# Patient Record
Sex: FEMALE | Race: WHITE | NOT HISPANIC OR LATINO | URBAN - METROPOLITAN AREA
[De-identification: names, ages, dates, MRNs, and addresses within clinical notes are randomized per-mention and may not be internally consistent; named-entity substitution may affect disease eponyms.]

---

## 2019-06-04 ENCOUNTER — EMERGENCY (EMERGENCY)
Facility: HOSPITAL | Age: 84
LOS: 1 days | Discharge: HOME | End: 2019-06-04
Attending: EMERGENCY MEDICINE | Admitting: EMERGENCY MEDICINE
Payer: MEDICARE

## 2019-06-04 VITALS
RESPIRATION RATE: 16 BRPM | TEMPERATURE: 98 F | DIASTOLIC BLOOD PRESSURE: 81 MMHG | HEART RATE: 87 BPM | SYSTOLIC BLOOD PRESSURE: 119 MMHG | OXYGEN SATURATION: 97 %

## 2019-06-04 VITALS
WEIGHT: 160.06 LBS | HEART RATE: 125 BPM | OXYGEN SATURATION: 95 % | HEIGHT: 64 IN | SYSTOLIC BLOOD PRESSURE: 131 MMHG | TEMPERATURE: 98 F | RESPIRATION RATE: 18 BRPM | DIASTOLIC BLOOD PRESSURE: 84 MMHG

## 2019-06-04 PROCEDURE — 72125 CT NECK SPINE W/O DYE: CPT | Mod: 26

## 2019-06-04 PROCEDURE — 72170 X-RAY EXAM OF PELVIS: CPT

## 2019-06-04 PROCEDURE — 99284 EMERGENCY DEPT VISIT MOD MDM: CPT

## 2019-06-04 PROCEDURE — 70450 CT HEAD/BRAIN W/O DYE: CPT

## 2019-06-04 PROCEDURE — 70450 CT HEAD/BRAIN W/O DYE: CPT | Mod: 26

## 2019-06-04 PROCEDURE — 73502 X-RAY EXAM HIP UNI 2-3 VIEWS: CPT

## 2019-06-04 PROCEDURE — 73502 X-RAY EXAM HIP UNI 2-3 VIEWS: CPT | Mod: 26,LT

## 2019-06-04 PROCEDURE — 72125 CT NECK SPINE W/O DYE: CPT

## 2019-06-04 PROCEDURE — 99284 EMERGENCY DEPT VISIT MOD MDM: CPT | Mod: 25

## 2019-06-04 RX ORDER — ACETAMINOPHEN 500 MG
650 TABLET ORAL ONCE
Refills: 0 | Status: DISCONTINUED | OUTPATIENT
Start: 2019-06-04 | End: 2019-06-04

## 2019-06-04 RX ORDER — ACETAMINOPHEN 500 MG
650 TABLET ORAL ONCE
Refills: 0 | Status: COMPLETED | OUTPATIENT
Start: 2019-06-04 | End: 2019-06-04

## 2019-06-04 RX ADMIN — Medication 650 MILLIGRAM(S): at 04:40

## 2019-06-04 RX ADMIN — Medication 650 MILLIGRAM(S): at 04:35

## 2019-06-04 NOTE — ED ADULT TRIAGE NOTE - CHIEF COMPLAINT QUOTE
patient found on the floor by staff during their rounds, unwitnessed fall, wound noted to left upper lip

## 2019-06-04 NOTE — ED PROVIDER NOTE - OBJECTIVE STATEMENT
89yo female bib ems after being foundon the floor. according to ems pt was found on the floor during rounds, unknown amt of time, no vomiting pt is nonverbal and has dementia so hx is limited

## 2019-06-04 NOTE — ED ADULT NURSE NOTE - OBJECTIVE STATEMENT
89 y/o F patient presents to ED from [place] via EMS s/p unwitnessed fall. As per EMS staff was doing rounds when they noticed patient was on floor. Staff reports they are unaware of how long patient was on floor. Patient A&Ox0. lungs CTA. laceration noted to left side of upper lip. abdomen soft, non tender, non distended. Non verbal indicators of pain not present. Safety and comfort measures provided and maintained.

## 2019-06-06 ENCOUNTER — EMERGENCY (EMERGENCY)
Facility: HOSPITAL | Age: 84
LOS: 1 days | Discharge: HOME | End: 2019-06-06
Attending: EMERGENCY MEDICINE | Admitting: EMERGENCY MEDICINE
Payer: MEDICARE

## 2019-06-06 VITALS
SYSTOLIC BLOOD PRESSURE: 120 MMHG | TEMPERATURE: 98 F | RESPIRATION RATE: 16 BRPM | HEART RATE: 109 BPM | OXYGEN SATURATION: 100 % | DIASTOLIC BLOOD PRESSURE: 60 MMHG

## 2019-06-06 VITALS
OXYGEN SATURATION: 100 % | RESPIRATION RATE: 16 BRPM | SYSTOLIC BLOOD PRESSURE: 126 MMHG | HEART RATE: 95 BPM | DIASTOLIC BLOOD PRESSURE: 60 MMHG

## 2019-06-06 PROCEDURE — 99284 EMERGENCY DEPT VISIT MOD MDM: CPT | Mod: 25

## 2019-06-06 PROCEDURE — 72125 CT NECK SPINE W/O DYE: CPT | Mod: 26,GV

## 2019-06-06 PROCEDURE — 72125 CT NECK SPINE W/O DYE: CPT

## 2019-06-06 PROCEDURE — 99284 EMERGENCY DEPT VISIT MOD MDM: CPT

## 2019-06-06 PROCEDURE — 70450 CT HEAD/BRAIN W/O DYE: CPT

## 2019-06-06 PROCEDURE — 70450 CT HEAD/BRAIN W/O DYE: CPT | Mod: 26,GV

## 2019-06-06 NOTE — ED ADULT NURSE REASSESSMENT NOTE - NS ED NURSE REASSESS COMMENT FT1
patient calm and resting, daughter at bedside with patient, no attempts to get out of bed. D/C to facility with negative CT scan. Awaiting ambulance transport back to facility.

## 2019-06-06 NOTE — ED PROVIDER NOTE - OBJECTIVE STATEMENT
89yo 89yo female bib ems s/p fall out o fbed. pt was found on the floor, unknown down time, pt has dementia so hx is limited. pt fell 2 days ago as well

## 2019-06-06 NOTE — ED PROVIDER NOTE - CARE PLAN
Principal Discharge DX:	Head injury due to trauma  Secondary Diagnosis:	Skin tear of elbow without complication

## 2019-06-06 NOTE — ED ADULT TRIAGE NOTE - CHIEF COMPLAINT QUOTE
Patient presents via ambulance , EMS states "nurse from facility found patient on floor by bed" unknown time on floor as per EMS. patient is on Xarelto  patient has dementia. bruise on lip noted, as per Dr Goss who treated patient monday for fall, patient sustained bruise on Monday.   patient is at neurological baseline as per EMS. Patient presents via ambulance , EMS states "nurse from facility found patient on floor by bed" unknown time on floor as per EMS. patient is on Xarelto  patient has dementia. bruise on lip noted, as per Dr Goss who treated patient 6/4/19 for fall, patient sustained bruise on 6/4/19.   patient is at neurological baseline as per EMS.

## 2019-06-06 NOTE — ED ADULT NURSE NOTE - OBJECTIVE STATEMENT
patient found on floor at nursing home, arrives with skin tear to left elbow, skin reapproximated by me with xeroform gauze and kathy dressing as per MD. Old bruising noted above left lip area, bluish/purplish in color. No other visible injury. Patient confused, speaks at times but oriented only to person.

## 2019-06-06 NOTE — ED ADULT NURSE NOTE - NSIMPLEMENTINTERV_GEN_ALL_ED
Implemented All Fall with Harm Risk Interventions:  [place] to call system. Call bell, personal items and telephone within reach. Instruct patient to call for assistance. Room bathroom lighting operational. Non-slip footwear when patient is off stretcher. Physically safe environment: no spills, clutter or unnecessary equipment. Stretcher in lowest position, wheels locked, appropriate side rails in place. Provide visual cue, wrist band, yellow gown, etc. Monitor gait and stability. Monitor for mental status changes and reorient to person, place, and time. Review medications for side effects contributing to fall risk. Reinforce activity limits and safety measures with patient and family. Provide visual clues: red socks.

## 2019-06-06 NOTE — ED ADULT NURSE NOTE - CHIEF COMPLAINT QUOTE
Patient presents via ambulance , EMS states "nurse from facility found patient on floor by bed" unknown time on floor as per EMS. patient is on Xarelto  patient has dementia. bruise on lip noted, as per Dr Goss who treated patient 6/4/19 for fall, patient sustained bruise on 6/4/19.   patient is at neurological baseline as per EMS.

## 2019-06-07 PROBLEM — E78.5 HYPERLIPIDEMIA, UNSPECIFIED: Chronic | Status: ACTIVE | Noted: 2019-06-04

## 2019-06-07 PROBLEM — M19.90 UNSPECIFIED OSTEOARTHRITIS, UNSPECIFIED SITE: Chronic | Status: ACTIVE | Noted: 2019-06-04

## 2019-06-07 PROBLEM — F32.9 MAJOR DEPRESSIVE DISORDER, SINGLE EPISODE, UNSPECIFIED: Chronic | Status: ACTIVE | Noted: 2019-06-04

## 2019-06-07 PROBLEM — F03.90 UNSPECIFIED DEMENTIA WITHOUT BEHAVIORAL DISTURBANCE: Chronic | Status: ACTIVE | Noted: 2019-06-04

## 2019-06-07 PROBLEM — I10 ESSENTIAL (PRIMARY) HYPERTENSION: Chronic | Status: ACTIVE | Noted: 2019-06-04

## 2019-06-07 PROBLEM — F41.9 ANXIETY DISORDER, UNSPECIFIED: Chronic | Status: ACTIVE | Noted: 2019-06-04

## 2019-10-08 ENCOUNTER — EMERGENCY (EMERGENCY)
Facility: HOSPITAL | Age: 84
LOS: 1 days | Discharge: HOME | End: 2019-10-08
Attending: EMERGENCY MEDICINE | Admitting: EMERGENCY MEDICINE
Payer: MEDICARE

## 2019-10-08 VITALS
SYSTOLIC BLOOD PRESSURE: 110 MMHG | OXYGEN SATURATION: 99 % | WEIGHT: 139.99 LBS | RESPIRATION RATE: 18 BRPM | HEART RATE: 82 BPM | HEIGHT: 61 IN | TEMPERATURE: 98 F | DIASTOLIC BLOOD PRESSURE: 73 MMHG

## 2019-10-08 VITALS
SYSTOLIC BLOOD PRESSURE: 139 MMHG | OXYGEN SATURATION: 96 % | HEART RATE: 84 BPM | RESPIRATION RATE: 16 BRPM | DIASTOLIC BLOOD PRESSURE: 78 MMHG

## 2019-10-08 PROCEDURE — 99284 EMERGENCY DEPT VISIT MOD MDM: CPT

## 2019-10-08 PROCEDURE — 99284 EMERGENCY DEPT VISIT MOD MDM: CPT | Mod: 25

## 2019-10-08 PROCEDURE — 72125 CT NECK SPINE W/O DYE: CPT | Mod: 26

## 2019-10-08 PROCEDURE — 72125 CT NECK SPINE W/O DYE: CPT

## 2019-10-08 PROCEDURE — 70450 CT HEAD/BRAIN W/O DYE: CPT

## 2019-10-08 PROCEDURE — 70450 CT HEAD/BRAIN W/O DYE: CPT | Mod: 26

## 2019-10-08 NOTE — ED ADULT TRIAGE NOTE - CHIEF COMPLAINT QUOTE
Pt. brought to ED via EMS from assisted living for unwitnessed fall. Per EMS pt. found on floor in room, epistaxis at time patient was found. Pt. on anticoagulation.

## 2019-10-08 NOTE — ED PROVIDER NOTE - OBJECTIVE STATEMENT
89yo female bib ems found on the ground, according to ems pt was foundon the floor, bleeding from her nose, unknown mechanism and unknown LOC, pt has hx of dementia so hx is is limited

## 2019-10-08 NOTE — ED ADULT NURSE REASSESSMENT NOTE - NS ED NURSE REASSESS COMMENT FT1
Patient is pending CT results, family is at the bedside.  Wet wipes given to patient's daughter at her request to clean patient's face.

## 2019-10-08 NOTE — ED PROVIDER NOTE - PATIENT PORTAL LINK FT
You can access the FollowMyHealth Patient Portal offered by [facility] by registering at the following website: [email or web address]. By joining [employer]’s FollowMyHealth portal, you will also be able to view your health information using other applications (apps) compatible with our system.

## 2019-10-08 NOTE — ED ADULT NURSE NOTE - OBJECTIVE STATEMENT
Patient with history of dementia BIBA from [facility] with c/o unwitnessed fall.  Per EMS, patient is on eliquis and was found on the floor with bloody nose.  Patient is a poor historian.  Patient arrives to the unit pleasantly confused and is awake but oriented x 0.  No verbal or non-verbal cues of pain noted.

## 2019-10-08 NOTE — ED ADULT NURSE REASSESSMENT NOTE - NS ED NURSE REASSESS COMMENT FT1
Patient is pending transportation back to facility.  Family has gone home.  Patient's stretcher pulled out of the room and placed in front of nurse's station for safety.  Patient's family was informed of this.  Stretcher is in lowest position with guard rails up.  Safety maintained.

## 2019-10-08 NOTE — ED ADULT NURSE NOTE - NSIMPLEMENTINTERV_GEN_ALL_ED
Implemented All Fall with Harm Risk Interventions:  [place] to call system. Call bell, personal items and telephone within reach. Instruct patient to call for assistance. Room bathroom lighting operational. Non-slip footwear when patient is off stretcher. Physically safe environment: no spills, clutter or unnecessary equipment. Stretcher in lowest position, wheels locked, appropriate side rails in place. Provide visual cue, wrist band, yellow gown, etc. Monitor gait and stability. Monitor for mental status changes and reorient to person, place, and time. Review medications for side effects contributing to fall risk. Reinforce activity limits and safety measures with patient and family. Provide visual clues: red socks.

## 2019-10-08 NOTE — ED ADULT NURSE NOTE - CAS EDN DISCHARGE ASSESSMENT
Outreach to patient to remind of lab test for cortisol pending.   Spoke to Uda, patients significant other and she will bring the patient to the clinic 10/27/17 for lab.   Elaine Mohan RN-BSN     Awake/Patient baseline mental status

## 2019-11-04 ENCOUNTER — EMERGENCY (EMERGENCY)
Facility: HOSPITAL | Age: 84
LOS: 1 days | Discharge: HOME | End: 2019-11-04
Attending: EMERGENCY MEDICINE | Admitting: EMERGENCY MEDICINE
Payer: MEDICARE

## 2019-11-04 VITALS
DIASTOLIC BLOOD PRESSURE: 67 MMHG | OXYGEN SATURATION: 95 % | SYSTOLIC BLOOD PRESSURE: 108 MMHG | RESPIRATION RATE: 16 BRPM | TEMPERATURE: 98 F | HEART RATE: 78 BPM

## 2019-11-04 VITALS
WEIGHT: 105.82 LBS | HEART RATE: 85 BPM | SYSTOLIC BLOOD PRESSURE: 117 MMHG | RESPIRATION RATE: 14 BRPM | OXYGEN SATURATION: 97 % | DIASTOLIC BLOOD PRESSURE: 74 MMHG | HEIGHT: 61 IN | TEMPERATURE: 98 F

## 2019-11-04 PROCEDURE — 70450 CT HEAD/BRAIN W/O DYE: CPT

## 2019-11-04 PROCEDURE — 72125 CT NECK SPINE W/O DYE: CPT | Mod: 26

## 2019-11-04 PROCEDURE — 99284 EMERGENCY DEPT VISIT MOD MDM: CPT | Mod: 25

## 2019-11-04 PROCEDURE — 72170 X-RAY EXAM OF PELVIS: CPT | Mod: 26

## 2019-11-04 PROCEDURE — 99284 EMERGENCY DEPT VISIT MOD MDM: CPT

## 2019-11-04 PROCEDURE — 70450 CT HEAD/BRAIN W/O DYE: CPT | Mod: 26

## 2019-11-04 PROCEDURE — 72170 X-RAY EXAM OF PELVIS: CPT

## 2019-11-04 PROCEDURE — 73562 X-RAY EXAM OF KNEE 3: CPT

## 2019-11-04 PROCEDURE — 73562 X-RAY EXAM OF KNEE 3: CPT | Mod: 26,RT

## 2019-11-04 PROCEDURE — 72125 CT NECK SPINE W/O DYE: CPT

## 2019-11-04 RX ORDER — SENNA PLUS 8.6 MG/1
2 TABLET ORAL
Qty: 0 | Refills: 0 | DISCHARGE

## 2019-11-04 RX ORDER — FONDAPARINUX SODIUM 2.5 MG/.5ML
1 INJECTION, SOLUTION SUBCUTANEOUS
Qty: 0 | Refills: 0 | DISCHARGE

## 2019-11-04 RX ORDER — METOPROLOL TARTRATE 50 MG
1 TABLET ORAL
Qty: 0 | Refills: 0 | DISCHARGE

## 2019-11-04 NOTE — ED PROVIDER NOTE - OBJECTIVE STATEMENT
89 female from assisted living facility presents to ER by ambulance with report of fall. Patient daughter at the bedside states she had called the facilty and was told that the patient was found on the ground next to her bed and had hot her head. Patient is DNR, on hospice, dementia, non ambulatory, information obtained from chart and daughter.

## 2019-11-04 NOTE — ED ADULT NURSE REASSESSMENT NOTE - NS ED NURSE REASSESS COMMENT FT1
Report given to Yainra at [facility], ambulance booked for pt by holguin clerk.

## 2019-11-04 NOTE — ED PROVIDER NOTE - CARE PROVIDER_API CALL
Jose Arrington)  Internal Medicine  [address] [place] [id number]  Phone: [phone]  Fax: [phone]  Follow Up Time:

## 2019-11-04 NOTE — ED PROVIDER NOTE - CPE EDP EYES NORM
PRE-OP EVALUATION    Patient Name: Jocy Ochoa    Pre-op Diagnosis: Special screening for malignant neoplasms, colon [Z12.11]    Procedure(s):  colonoscopy     Surgeon(s) and Role:     Shasha Guerrero MD - Primary    Pre-op vitals reviewed. Past Surgical History    LAPAROSCOPIC SPLENECTOMY  '92    COLONOSCOPY  2007    ANGIOGRAM  5/29/07    Comment [facility] [place]    INJECTION, W/WO CONTRAST, DX/THERAPEUTIC SUBSTANCE, EPIDURAL/SUBARACHNOID; LUMBAR/SACRAL  1/17/2012    Comment Perform No       Drug Use: No     Available pre-op labs reviewed.     Lab Results  Component Value Date   WBC 10.3 04/03/2017   RBC 4.98 04/03/2017   HGB 14.5 04/03/2017   HCT 45.0 04/03/2017   MCV 90.4 04/03/2017   MCH 29.1 04/03/2017   MCHC 32.2 04/03/2017   RDW normal...

## 2019-11-04 NOTE — ED ADULT NURSE NOTE - OBJECTIVE STATEMENT
Pt comes from memory care unit by EMS. Pt was found on the floor on her room with abrasion and redness to forehead. Pt has dementia, alert and oriented x0, not very communicative. Pt breathing easy, unlabored, no signs of distress. Pt is on xarelto. Pupils ERRLA.

## 2019-11-04 NOTE — ED ADULT NURSE NOTE - NSIMPLEMENTINTERV_GEN_ALL_ED
Implemented All Fall with Harm Risk Interventions:  [place] to call system. Call bell, personal items and telephone within reach. Instruct patient to call for assistance. Room bathroom lighting operational. Non-slip footwear when patient is off stretcher. Physically safe environment: no spills, clutter or unnecessary equipment. Stretcher in lowest position, wheels locked, appropriate side rails in place. Provide visual cue, wrist band, yellow gown, etc. Monitor gait and stability. Monitor for mental status changes and reorient to person, place, and time. Review medications for side effects contributing to fall risk. Reinforce activity limits and safety measures with patient and family. Provide visual clues: red socks.

## 2019-11-04 NOTE — ED PROVIDER NOTE - PATIENT PORTAL LINK FT
You can access the FollowMyHealth Patient Portal offered by [facility] by registering at the following website: [email or web address]. By joining [employer]’s FollowMyHealth portal, you will also be able to view your health information using other applications (apps) compatible with our system.

## 2019-11-04 NOTE — ED PROVIDER NOTE - CARE PLAN
Principal Discharge DX:	Fall, initial encounter  Secondary Diagnosis:	Abrasion of forehead, initial encounter  Secondary Diagnosis:	Abrasion of right knee, initial encounter

## 2019-11-21 ENCOUNTER — EMERGENCY (EMERGENCY)
Facility: HOSPITAL | Age: 84
LOS: 1 days | Discharge: HOME | End: 2019-11-21
Attending: EMERGENCY MEDICINE | Admitting: EMERGENCY MEDICINE
Payer: MEDICARE

## 2019-11-21 VITALS
RESPIRATION RATE: 18 BRPM | HEIGHT: 61 IN | HEART RATE: 89 BPM | WEIGHT: 136.03 LBS | OXYGEN SATURATION: 100 % | TEMPERATURE: 97 F | DIASTOLIC BLOOD PRESSURE: 62 MMHG | SYSTOLIC BLOOD PRESSURE: 110 MMHG

## 2019-11-21 VITALS
TEMPERATURE: 98 F | RESPIRATION RATE: 16 BRPM | SYSTOLIC BLOOD PRESSURE: 106 MMHG | HEART RATE: 65 BPM | DIASTOLIC BLOOD PRESSURE: 64 MMHG | OXYGEN SATURATION: 96 %

## 2019-11-21 PROCEDURE — 70450 CT HEAD/BRAIN W/O DYE: CPT | Mod: 26

## 2019-11-21 PROCEDURE — 72125 CT NECK SPINE W/O DYE: CPT

## 2019-11-21 PROCEDURE — 72125 CT NECK SPINE W/O DYE: CPT | Mod: 26

## 2019-11-21 PROCEDURE — 70450 CT HEAD/BRAIN W/O DYE: CPT

## 2019-11-21 PROCEDURE — 99284 EMERGENCY DEPT VISIT MOD MDM: CPT | Mod: 25

## 2019-11-21 PROCEDURE — 99284 EMERGENCY DEPT VISIT MOD MDM: CPT

## 2019-11-21 NOTE — ED ADULT NURSE NOTE - OBJECTIVE STATEMENT
89 y/o F patient presents to ED from [facility] via EMS c/o unwitnessed fall, unknown LOC. As per patient's daughter patient has history of dementia and was seen in hospital 2 weeks ago for fall. Patient's daughter reports patient is non verbal at baseline. Patient A&Ox0. patient presents with dried blood to forehead, b/l cheeks and by mouth. laceration and hematoma noted to left side of forehead. Safety and comfort measures provided and maintained.

## 2019-11-21 NOTE — ED PROVIDER NOTE - PATIENT PORTAL LINK FT
You can access the FollowMyHealth Patient Portal offered by [facility] by registering at the following website: [email or web address]. By joining [employer]’s FollowMyHealth portal, you will also be able to view your health information using other applications (apps) compatible with our system.

## 2019-11-21 NOTE — ED ADULT TRIAGE NOTE - CHIEF COMPLAINT QUOTE
Pt. brought to ED via EMS from assisted living for unwitnessed fall in room. LOC unknown. Pt. unable to offer details. Per EMS patient was bleeding from mouth. Hematoma with lace to left forehead.

## 2019-11-21 NOTE — ED ADULT NURSE NOTE - NSIMPLEMENTINTERV_GEN_ALL_ED
Implemented All Fall with Harm Risk Interventions:  [place] to call system. Call bell, personal items and telephone within reach. Instruct patient to call for assistance. Room bathroom lighting operational. Non-slip footwear when patient is off stretcher. Physically safe environment: no spills, clutter or unnecessary equipment. Stretcher in lowest position, wheels locked, appropriate side rails in place. Provide visual cue, wrist band, yellow gown, etc. Monitor gait and stability. Monitor for mental status changes and reorient to person, place, and time. Review medications for side effects contributing to fall risk. Reinforce activity limits and safety measures with patient and family. Provide visual clues: red socks.

## 2019-11-21 NOTE — ED PROVIDER NOTE - PHYSICAL EXAMINATION
appears comfortable, No verbal communication  pupilsa reactive, eomi,   mm moist, no oral injury, no facial pain, Scalp hematoma notedWith superficialAbrasion  supple, no c/t/l spine tenderness, from, trachea in midline  Shahid chest expansion, No retractions, no deformity, no clavicular Deformity  S1 S2 distant  abd soft,    no pelvic pain  moves all ext, Generalized weakness no swelling noted  Neuro aa, Patient is not cooperative with the exam  Patient no bruises notedNo obvious hip tenderness or deformityAble to move lower extremitiesWith outpatient mowning  skin no bruises, no swelling

## 2019-11-21 NOTE — ED PROVIDER NOTE - OBJECTIVE STATEMENT
[id number] nursing [place] form presentsUnwitnessed fall. Patient was found on the floorNo history from patient review chartBelchertown State [facility]-Mind[facility]

## 2020-02-18 ENCOUNTER — EMERGENCY (EMERGENCY)
Facility: HOSPITAL | Age: 85
LOS: 1 days | Discharge: HOME | End: 2020-02-18
Attending: EMERGENCY MEDICINE | Admitting: EMERGENCY MEDICINE
Payer: MEDICARE

## 2020-02-18 VITALS
TEMPERATURE: 98 F | WEIGHT: 160.06 LBS | HEIGHT: 64 IN | RESPIRATION RATE: 19 BRPM | OXYGEN SATURATION: 99 % | HEART RATE: 72 BPM | SYSTOLIC BLOOD PRESSURE: 144 MMHG | DIASTOLIC BLOOD PRESSURE: 88 MMHG

## 2020-02-18 VITALS
RESPIRATION RATE: 18 BRPM | TEMPERATURE: 99 F | HEART RATE: 100 BPM | DIASTOLIC BLOOD PRESSURE: 65 MMHG | SYSTOLIC BLOOD PRESSURE: 119 MMHG | OXYGEN SATURATION: 96 %

## 2020-02-18 PROCEDURE — 70486 CT MAXILLOFACIAL W/O DYE: CPT | Mod: 26

## 2020-02-18 PROCEDURE — 99285 EMERGENCY DEPT VISIT HI MDM: CPT | Mod: 25

## 2020-02-18 PROCEDURE — 70450 CT HEAD/BRAIN W/O DYE: CPT | Mod: 26

## 2020-02-18 PROCEDURE — 72125 CT NECK SPINE W/O DYE: CPT

## 2020-02-18 PROCEDURE — 70450 CT HEAD/BRAIN W/O DYE: CPT

## 2020-02-18 PROCEDURE — 72125 CT NECK SPINE W/O DYE: CPT | Mod: 26

## 2020-02-18 PROCEDURE — 70486 CT MAXILLOFACIAL W/O DYE: CPT

## 2020-02-18 PROCEDURE — 99284 EMERGENCY DEPT VISIT MOD MDM: CPT

## 2020-02-18 RX ADMIN — Medication 1 ENEMA: at 23:12

## 2020-02-18 RX ADMIN — Medication 100 MILLIGRAM(S): at 22:44

## 2020-02-18 RX ADMIN — Medication 0.5 MILLIGRAM(S): at 22:44

## 2020-02-18 NOTE — ED ADULT NURSE NOTE - NSIMPLEMENTINTERV_GEN_ALL_ED
Implemented All Fall with Harm Risk Interventions:  [place] to call system. Call bell, personal items and telephone within reach. Instruct patient to call for assistance. Room bathroom lighting operational. Non-slip footwear when patient is off stretcher. Physically safe environment: no spills, clutter or unnecessary equipment. Stretcher in lowest position, wheels locked, appropriate side rails in place. Provide visual cue, wrist band, yellow gown, etc. Monitor gait and stability. Monitor for mental status changes and reorient to person, place, and time. Review medications for side effects contributing to fall risk. Reinforce activity limits and safety measures with patient and family. Provide visual clues: red socks.

## 2020-02-18 NOTE — ED PROVIDER NOTE - OBJECTIVE STATEMENT
89 y/o female with PMHx dementia BIBA due to fall. pt son/daughter at bedside, notes patient has hx of frequent falls. notes patient was not routinely given meds tonight in which patient got out of bed, they not facility members found her on the floor, notes likely on floor for no more than an hour. Notes patient currently on Eliquis. As per family, pt baseline currently. denies Cp, sob, abdominal pain.

## 2020-02-18 NOTE — ED PROVIDER NOTE - CARE PLAN
Principal Discharge DX:	Head injuries, initial encounter  Secondary Diagnosis:	Cervical strain, acute, initial encounter  Secondary Diagnosis:	Facial contusion, initial encounter

## 2020-02-18 NOTE — ED PROVIDER NOTE - PATIENT PORTAL LINK FT
You can access the FollowMyHealth Patient Portal offered by [facility] by registering at the following website: [email or web address]. By joining "[employer]’s FollowMyHealth portal, you will also be able to view your health information using other applications (apps) compatible with our system.

## 2020-02-18 NOTE — ED PROVIDER NOTE - PHYSICAL EXAMINATION
Constitutional: Awake, Alert, non-toxic. NAD. Well appearing, well nourished.   HEAD: Normocephalic, atraumatic.   EYES: (+) right periorbital hematoma, non-gaping 1cm laceration without active bleed, no subconjunctival hemorrhage, no obvious hyphema. PERRL, EOM intact, conjunctiva and sclera are clear bilaterally.   ENT: No rhinorrhea, normal pharyx, patent, no tonsillar exudate or enlargement, mucous membranes pink/moist, no erythema, no drooling or stridor.   NECK: Supplee, non-tender  BACK: No midline or paraspinal TTP of cervical/thoracic/lumbar spine, FROM. No ecchymosis or hematomas.   CARDIOVASCULAR: Normal S1, S2; regular rate and rhythm.  RESPIRATORY: Normal respiratory effort; breath sounds CTAB  ABDOMEN: Soft; non-tender, non-distended.   EXTREMITIES: (+) forcefully limited ROM, Passive ROM intact, pt not following commands. no hip TTP, no external rotation of leg; no extremity TTP, distal pulses palpable and symmetric  SKIN: Warm, dry; (+) ecchymosis of left forearm without TTP or swelling brisk capillary refill.  NEURO:  Sensory and motor functions are grossly intact. pt not following commands, Cn intact, 5/5 motor function. pt non-verbal.

## 2020-02-18 NOTE — ED ADULT NURSE NOTE - CHIEF COMPLAINT QUOTE
Unwitnessed fall while at [facility], presents w/ blood and swelling to right side of face.

## 2020-02-18 NOTE — ED PROVIDER NOTE - PROGRESS NOTE DETAILS
my shift has ended, pt care to be continued by Dr. Vaughan Pt manually disimpacted. Large amount of hard stool removed.

## 2020-02-18 NOTE — ED PROVIDER NOTE - ATTENDING CONTRIBUTION TO CARE
Pt with dementia, DNR, at baseline on eliquis with right facial trauma, has been deteriorating over the last couple of months she has stopped eating, family wants her to return to the NH. Precautions reviewed.

## 2020-09-21 NOTE — ED PROVIDER NOTE - CPE EDP GASTRO NORM
normal... Myalgia Treatment: I explained this is common when taking isotretinoin. If this worsens they will contact us. They may try OTC ibuprofen.

## 2020-10-16 NOTE — ED ADULT NURSE NOTE - ED COMFORT CARE
Post Acute Skilled Nursing Home Initial Visit Note     Date of Service: 10/16/2020  Location seen at: [facility]  Subacute / Skilled Need: Rehabilitation and Wound Care    PCP: Ramón Barroso MD   Patient Care Team:  Ramón Barroso MD as PCP - General (Internal Medicine)  Johan Castillo MD as Ophthalmology (Ophthalmology)  Diego Medeiros MD as Dermatology (Dermatology)  Ramón Barroso MD (Internal Medicine)  Yesenia Bashir MD as Post Acute Facility Provider: Physician (Geriatric Medicine)  MOISE Morales as Post Acute Facility Provider: APC (Nurse Practitioner)  Seen by MOISE Morales today    Franci Mendoza is a 89 year old female presenting to Post Acute Skilled Nursing for: Rehabilitation and wound care following recent hospitalization at [facility] from 9/29/20-10/14/20 for small bowel obstruction secondary to adhesions involving the ileum and severe sepsis.    Portions of this note are brought forward from Dr. Ramón Barroso's note; reviewed and edited by me as appropriate.     \"89-year-old woman who was recently hospitalized with constipation and failure to thrive and had a very high TSH consistent with her not taking her thyroid replacement.  She was brought back to the emergency room on the day of admission with increasing abdominal pain and constipation.  There, she was found to have evidence of a small-bowel obstruction and perforation.  She was taken to the operating room that day and was found to have erosion of the adhesion into her ileum.  Bowel was resected and adhesiolysis was performed.  Postoperatively, she went to the ICU.     In the ICU, was one of gradual improvement.  Bowel function returned.  She was supported with TPN.  She was found to have bacteremia with Enterobacter and treated with appropriate antibiotics, which were discontinued on 10/10/2020.  No further signs of infection post-antibiotics.  She recovered  very nicely from all other standpoints but has continued to be anorexic.  TPN was ultimately discontinued on 10/12/2020 and the power of  for health care does not plan tube feedings.      She is receiving Marinol 2.5 mg b.i.d., but that has not been effective after 5 days of therapy, so we will not be continued. P.r.n. medications have not been needed.  Her diet is unrestricted.  Certainly, would encourage nutritional support. No followup labs should be necessary.  If the patient continues to eat poorly would consider instituting hospice care.  Surgery recommends wet-to-dry dressings to her midline incision daily.  Activity as tolerated.  Follow up with the surgeon, Dr. Eder Walker in 3-4 weeks.\"     History of Present Illness:     Prior to hospitalization, patient residing in independent living apartment with elevator access. She would use cane to assist with mobility. Independent with ADLs, including simple meals.     Patient seen today in room, resting comfortably in bed. Pleasantly confused this visit, but able to answer questions when asked. Overall, states that she is \"better now that I got some sleep.\" Denies pain and does not appear in pain. Unsure of last bowel movement. No bowel movement since SNF admission. Per hospital chart, last bowel movement on 10/9/20. Denies fever/chills, abdominal pain, dysuria, hematuria, nausea/vomiting. Denies having any further medical concerns/complaints at this time. Nursing also denies having any additional medical concerns/complaints at this time regarding patient.     HISTORY  Past Medical History:   Diagnosis Date   • Essential tremor    • Hypothyroidism TSH 12/18   • Macular degeneration of right eye     Epiretinal membrane   • Osteoporosis 2003    Fosamax 2003 to 2010   • Osteoporosis    • PMR (polymyalgia rheumatica) (CMS/[facility], located within [facility]) 6/14    probable, ESR 47 - steroids D/C 9/14   • Thyroid condition    • Uncomplicated senile dementia (CMS/[facility], located within [facility])       reports that she  has never smoked. She has never used smokeless tobacco. She reports that she does not drink alcohol or use drugs.  Past Surgical History:   Procedure Laterality Date   • Abdomen surgery     • Cataract extraction, bilateral  2012   • Colonoscopy  04/23/2009   • Dexa bone density axial skeleton  07/25/2008   • Hysterectomy  1992    complete, ovarian cyst, ? cancer   • Removal colon/ileostomy,continent  09/29/2020    Dr Eder Walker   • Vitrectomy,strip epiretinal membrane  8/11    right     Family History   Adopted: Yes   Problem Relation Age of Onset   • Alcohol Abuse Sister      History     Last reviewed in this visit by MOISE Morales on 10/16/2020 at  1:37 PM    Sections Reviewed    Tobacco        PROBLEM LIST:  Patient Active Problem List   Diagnosis   • Osteoporosis   • Hypothyroidism   • PMR (polymyalgia rheumatica) (CMS/HCC)   • Intention tremor   • Dementia without behavioral disturbance (CMS/[facility])   • Generalized abdominal pain   • Contusion of foot     ADVANCE DIRECTIVES:  Power of  Status: Not currently activated, but activation process started on 10/16/20.   Code Status:  on file and DNR (do not resuscitate)  Goals of Care: optimize comfort, regardless of expected survival and function. Goals of care discussed with patient's representative- Kirstyjennifer Cruz, friend. Discussed with Kirsty that patient is not eating or drinking much and that she is very frail with low BMI. Discussed her abdominal wound and bilateral heel wounds that will have poor healing with no nutrition. Further discussed that patient was made nonweigthbearing by wound team second to her DTIs to bilateral heels, making her rehab potential poor. Last documented bowel movement was on 10/9/20. Patient currently hospice appropriate with severe protein calorie malnutrition and dementia. Hospice services discussed in great detail with Kirsty, including what it would entail. Informed Kirsty that patient could receive hospice services at [facility]  through outside home care agency. Kirsty agreeable to hospice, but needs to speak with patient's niece and nephew over the weekend before making final decision. Discussed that we could try low dose appetite stimulant, PPI, and stool softener over the weekend to see if there is improvement with patient. Kirsty agreeable. If patient declines over the weekend, Kirsty does not want patient to return to hospital and would like her made comfortable at facility. Kirsty states that patient's living will states that patient does not want continued tube feeding. Lab work will also be stopped. Kirsty is agreeable with as needed IV fluids for hydration to keep patient comfortable. Writer will inform nursing and social work of Kirsty's decision and follow-up with Kirsty early next week. Per Kirsty, inpatient hospice evaluated patient in hospital, but she did not meet criteria for inpatient hospice status at that time.     DEPRESSION SCREENING:  Recent PHQ 2/9 Score    PHQ 2:  Date Adult PHQ 2 Score Adult PHQ 2 Interpretation   9/22/2020 0 No further screening needed       PHQ 9:       DEPRESSION ASSESSMENT/PLAN:  PHQ 2/9 assessment not completed, as patient unable to answer questions appropriately second to dementia diagnosis.     ALLERGIES:  Allergies as of 10/16/2020   • (No Known Allergies)       CURRENT MEDICATIONS:   Current Outpatient Medications   Medication Sig Dispense Refill   • Nutritional Supplements (Nutritional Supplement Plus) Liquid Take 90 mLs by mouth 2 times daily. MED PASS 2.0 BID     • levothyroxine 100 MCG tablet Take 1 tablet by mouth daily (before breakfast). Do not start before October 15, 2020.     • acetaminophen (TYLENOL) 325 MG tablet Take 650 mg by mouth every 4 hours as needed for Pain or Fever. 2 tabs (=650mg)     • Magnesium Hydroxide (MILK OF MAGNESIA PO) Take 30 mLs by mouth as needed (constipation).     • bisacodyl (DULCOLAX) 10 MG suppository Place 10 mg rectally daily as needed for Constipation.        No current facility-administered medications for this visit.      Medications reviewed / reconciled: Yes    BASELINE FUNCTIONAL STATUS:  Independent and cane.    CURRENT FUNCTIONAL STATUS:  Non weight bearing (NWB) to bilateral lower extremities. Dependent with all cares.    DIET:  Consistency: Pureed solids, regular (thin) liquids  Type: regular  Appetite: Poor    REVIEW OF SYSTEMS:  Review of Systems   Constitutional: Positive for activity change (Decreased), appetite change (None), fatigue and unexpected weight change (Loss). Negative for chills, diaphoresis and fever.   HENT: Negative for congestion, nosebleeds and trouble swallowing.    Eyes: Negative.    Respiratory: Negative for cough, chest tightness, shortness of breath and wheezing.    Cardiovascular: Negative for chest pain, palpitations and leg swelling.   Gastrointestinal: Positive for constipation. Negative for abdominal distention, abdominal pain, anal bleeding, blood in stool, diarrhea, nausea, rectal pain and vomiting.   Genitourinary: Negative for difficulty urinating, dysuria, flank pain, frequency, hematuria and urgency.        Incontinent   Musculoskeletal: Positive for gait problem (Nonweightbearing to bilateral lower extremities). Negative for arthralgias, back pain, joint swelling, myalgias, neck pain and neck stiffness.   Skin: Positive for wound (Bilateral heels; abdomen). Negative for color change and rash.   Neurological: Positive for weakness. Negative for dizziness, syncope, speech difficulty and headaches.   Psychiatric/Behavioral: Positive for confusion. Negative for agitation, behavioral problems and sleep disturbance.     VITALS:  Vitals:    10/16/20 1336   BP: 122/62   Pulse: 84   Resp: 20   Temp: 99.1 °F (37.3 °C)   SpO2: 95%   Weight: 47.2 kg   Height: 5' 4\" (1.626 m)   PainSc:  0     PHYSICAL ASSESSMENT:  Physical Exam  Constitutional:       Comments: Frail, cachectic    HENT:      Head: Normocephalic and atraumatic.       Right Ear: External ear normal.      Left Ear: External ear normal.      Nose: Nose normal.      Mouth/Throat:      Mouth: Mucous membranes are dry.      Pharynx: Oropharynx is clear.   Eyes:      General:         Right eye: No discharge.         Left eye: No discharge.   Cardiovascular:      Rate and Rhythm: Normal rate and regular rhythm.      Pulses: Normal pulses.      Heart sounds: Normal heart sounds.   Pulmonary:      Effort: Pulmonary effort is normal. No respiratory distress.      Breath sounds: No stridor. No wheezing, rhonchi or rales.      Comments: Diminished bilaterally throughout  Chest:      Chest wall: No tenderness.   Abdominal:      General: There is no distension.      Palpations: Abdomen is soft. There is no mass.      Tenderness: There is no abdominal tenderness. There is no right CVA tenderness, left CVA tenderness, guarding or rebound.      Hernia: No hernia is present.      Comments: Hypoactive bowel sounds in all four quadrants.   Musculoskeletal:         General: No swelling or tenderness.      Right lower leg: No edema.      Left lower leg: No edema.   Skin:     General: Skin is warm and dry.      Findings: No erythema or rash.      Comments: Midline abdominal surgical incision well approximated. No erythema, drainage. Bilateral heels with skin intact, but boggy. Blue boots placed by writer.    Neurological:      Mental Status: She is alert. She is disoriented.      Motor: Weakness present.      Gait: Gait abnormal (Nonweightbearing to bilateral lower extremities).      Comments: Oriented x1 to person only. Disoriented x 2 to place and time.   Psychiatric:         Mood and Affect: Mood normal.         Behavior: Behavior normal.       LABS:  CBC:   WBC (K/mcL)   Date Value   10/09/2020 11.1 (H)   12/03/2018 9.1     RBC (mil/mcL)   Date Value   10/09/2020 2.90 (L)   12/03/2018 4.21     HGB (g/dL)   Date Value   10/09/2020 9.3 (L)   12/03/2018 12.9     PLT (K/mcL)   Date Value   10/09/2020  166   12/03/2018 205   08/15/2011 186   , BMP:   Sodium (mmol/L)   Date Value   10/12/2020 136   12/03/2018 143     Potassium (mmol/L)   Date Value   10/12/2020 4.2   12/03/2018 4.6     Chloride (mmol/L)   Date Value   10/12/2020 104   12/03/2018 107     Glucose (mg/dL)   Date Value   10/12/2020 115 (H)   12/03/2018 89     CALCIUM (mg/dL)   Date Value   12/03/2018 9.3     Calcium (mg/dL)   Date Value   10/12/2020 7.8 (L)     Carbon Dioxide (mmol/L)   Date Value   10/12/2020 31   12/03/2018 28     BUN (mg/dL)   Date Value   10/12/2020 22 (H)   12/03/2018 30 (H)     Creatinine (mg/dL)   Date Value   10/12/2020 0.34 (L)   12/03/2018 0.72    and Mg:   Magnesium (mg/dL)   Date Value   10/12/2020 2.1     ASSESSMENT AND PLAN  1. Small bowel obstruction due to adhesions (CMS/HCC)  3. FTT (failure to thrive) in adult  8. Protein-calorie malnutrition, unspecified severity (CMS/HCC)  9. Constipation  - Perforated terminal ileum due to erosion of adhesive band into the small bowel. S/P ileocecal resection with ileocolostomy and adhesiolysis with Dr. Eder Walker on 9/29/20. Pathologic diagnosis: Mural acute inflammation, serositis and adhesions.  - VMP wound NP and RN following. Continue wound care as follows: Cleanse with normal saline; pack lightly with normal saline moistened gauze; cover with 4x4 foam dressing. Change daily and PRN. Nursing to monitor each shift for s/s of infection.   - Albumin= 1.5 and protein= 4.8 on 10/12/20.   - Poor PO intake. Was on marinol 2.5mg BID x 5 days in hospital, but discontinued as not helping. Start mirtazapine 3.75mg QHS and pantoprazole 20mg daily over the weekend to see if there is improvement with patient appetite.   - Nursing to encourage fluids at meals and in between meals.  - Oral cares 3x daily. Carmex to lips.   - Unknown last bowel movement. Bisacodyl scheduled for 10/16/20. Will start senna-s one tablet nightly.   - Pureed diet, regular (thin) liquids.  - Dietician consult.  Continue nutritional supplement as ordered.   - Will attempt PT/OT, but patient has poor rehab potential and is hospice appropriate.  - Follow-up with Dr. Eder Walker in 3-4 weeks if not converted to hospice at that time.     2. Late onset Alzheimer's disease without behavioral disturbance (CMS/HCC)  - HCPOA on file, but not activated.  - DNR.   - Continue supportive measures, such as frequent reorientation.    4. Acquired hypothyroidism  - TSH= 56.820 on 9/23/20. History of noncompliance with medication.  - Continue levothyroxine daily.     5. Postoperative anemia  - Hemoglobin= 9.3 on 10/9/20.   - No overt s/s of bleeding.     6. Contusion of foot, unspecified laterality, subsequent encounter  7. Pressure injury of skin of heel, unspecified injury stage, unspecified laterality  - [facility]MP wound RN and NP to follow. Continue wound care as follows: skin prep to heels. Nonweightbearing to bilateral lower extremities.   - Prevalon boots in bed.  - Calazime cream to coccyx.  - Air mattress.  - Q2hr repositioning.     FOLLOW UP APPOINTMENTS:  No future appointments.    DISCHARGE PLANNING: Hospice? See goals of care note above.     Prognosis: poor    Discussed with: RN / Nursing, Family, Patient, MD, SW, OT and PT    Barriers to discharge: therapy needs    Anticipated disposition: Disposition Not Yet Determined    Total time spent is more than 75 minutes, with more than 50% of the time spent in coordination of care, counseling, review of records and discussion of plan of care with the patient /staff /family.    Additional 35 minutes with > 50 % of visit spent counseling and coordinating services which included chart review, family/HCPOA discussion, MD consultation   Explanation of wait/Patient informed/Family informed

## 2022-06-01 NOTE — ED ADULT NURSE NOTE - OBJECTIVE STATEMENT
Patient to PACU & placed on appropriate monitors. Cart low, locked with siderails up. Pt. brought to ED from [facility] for unwitnessed fall. Pt. unable to provide details. Pt. complaining of abdominal pain upon examination. Daughter present at bedside stated patient has poor po intake. Abrasion above right eyebrow. Ecchymosis to bilateral upper and lower extremities.

## 2022-06-06 NOTE — ED ADULT TRIAGE NOTE - NS ED NURSE DIRECT TO ROOM YN
Chart reviewed, immunization record updated.  No new results noted on [facility] or [facility] web site.  Care Everywhere updated.   Patient care coordination note and upcoming PCP visit updated.  Patient has scheduled PCP visit on 6/07/2022.  CHASE sent to Dr. Healy for Lipid Panel.  Spoke to patient concerning signing up for Digital Medicine program and scheduling set up for 6/21/2022, states he will discuss with Dr. Rodriguez at upcoming visit on 6/07/2022.    
Yes

## 2022-06-24 NOTE — ED ADULT NURSE NOTE - PMH
Soft diet  Drink plenty of fluids.   Return to the emergency department new or worsening symptoms such as worsening abdominal pain, high fevers, persistent vomiting preventing any oral fluid intake or decreased stool output
Anxiety disorder    Arthritis    Dementia    Depression    Hyperlipidemia    Hypertension

## 2024-04-09 NOTE — ED ADULT NURSE NOTE - EENT WDL
Eyes with no visual disturbances.  Ears clean and dry and no hearing difficulties. Nose with pink mucosa and no drainage.  Mouth mucous membranes moist and pink.  No tenderness or swelling to throat or neck. [id number]

## 2024-09-23 NOTE — ED ADULT NURSE NOTE - NSIMPLEMENTINTERV_GEN_ALL_ED
[___] : [unfilled] [No Ischemia] : no Ischemia [LVEF ___%] : LVEF [unfilled]% Implemented All Fall with Harm Risk Interventions:  [place] to call system. Call bell, personal items and telephone within reach. Instruct patient to call for assistance. Room bathroom lighting operational. Non-slip footwear when patient is off stretcher. Physically safe environment: no spills, clutter or unnecessary equipment. Stretcher in lowest position, wheels locked, appropriate side rails in place. Provide visual cue, wrist band, yellow gown, etc. Monitor gait and stability. Monitor for mental status changes and reorient to person, place, and time. Review medications for side effects contributing to fall risk. Reinforce activity limits and safety measures with patient and family. Provide visual clues: red socks.

## 2025-01-06 NOTE — ED ADULT TRIAGE NOTE - ARRIVAL FROM
"Subjective      Chief Complaint   Patient presents with    6 MONTH FOLLOW UP          She has a history of having a anterior wall myocardial infarction in 2016 was rather extensive she has a clot.  She has been maintained on Eliquis for this clot.  The last echocardiogram was in 2018 showed ejection fraction of 30 to 34% range, the left atrium was dilated.             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/06/2023    Other hyperlipidemia 09/06/2023    Osteoporosis 09/06/2023    Lung nodule 09/06/2023    Hypothyroidism 09/06/2023    Hypertension 09/06/2023    Esophageal reflux 09/06/2023    History of DVT (deep vein thrombosis) 09/06/2023    Congestive heart failure 09/06/2023    Arteriosclerotic heart disease 09/06/2023    Age-related osteoporosis without current pathological fracture 09/06/2023     Resolved Ambulatory Problems     Diagnosis Date Noted    Respiratory failure (Multi) 09/06/2023     Past Medical History:   Diagnosis Date    Acute MI, anterior wall (Multi)     Arthritis     Bilateral pneumonia     COPD (chronic obstructive pulmonary disease) (Multi) 07/06/2016    Disease of thyroid gland     Scoliosis     Varicella         Visit Vitals  LMP  (LMP Unknown)   OB Status Postmenopausal   Smoking Status Former        Objective     Physical Exam       Lab Review:   {Recent labs:19471::\"not applicable\"}      Lab Results   Component Value Date    CHOL 151 07/19/2024    CHOL 147 07/25/2023    CHOL 126 (L) 07/13/2022     Lab Results   Component Value Date    HDL 71.0 07/19/2024    HDL 63 07/25/2023    HDL 54 07/13/2022     Lab Results   Component Value Date    LDLCALC 61 (L) 07/19/2024    LDLCALC 64 (L) 07/25/2023    LDLCALC 56 (L) 07/13/2022     Lab Results " "  Component Value Date    TRIG 94 07/19/2024    TRIG 100 07/25/2023    TRIG 82 07/13/2022     No components found for: \"CHOLHDL\"     Assessment/Plan     No problem-specific Assessment & Plan notes found for this encounter.     " [facility]Assisted living facility